# Patient Record
Sex: FEMALE | Race: WHITE | HISPANIC OR LATINO | Employment: UNEMPLOYED | URBAN - METROPOLITAN AREA
[De-identification: names, ages, dates, MRNs, and addresses within clinical notes are randomized per-mention and may not be internally consistent; named-entity substitution may affect disease eponyms.]

---

## 2022-11-21 PROBLEM — S41.101A: Status: ACTIVE | Noted: 2022-11-21
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
## 2022-11-21 PROBLEM — T79.A11A: Status: ACTIVE | Noted: 2022-11-21

## 2022-11-21 PROBLEM — S45.111A: Status: ACTIVE | Noted: 2022-11-21

## 2022-11-23 PROBLEM — S48.921A: Status: ACTIVE | Noted: 2022-11-23

## 2022-12-11 ENCOUNTER — NURSE TRIAGE (OUTPATIENT)
Dept: ADMINISTRATIVE | Facility: CLINIC | Age: 22
End: 2022-12-11

## 2022-12-11 PROBLEM — R10.9 ABDOMINAL PAIN: Status: ACTIVE | Noted: 2022-12-11

## 2022-12-11 NOTE — TELEPHONE ENCOUNTER
Reason for Disposition   [1] Bleeding from incision AND [2] won't stop after 10 minutes of direct pressure    Additional Information   Negative: [1] Major abdominal surgical incision AND [2] wound gaping open AND [3] visible internal organs   Negative: Sounds like a life-threatening emergency to the triager    Protocols used: Post-Op Incision Symptoms-A-AH  sibling called re pt out of surg a week. leg has pad on it that is full of blood and even leaking out of pad. Caller states area smells bad. cant get fever down. Pt c/o leg hurting. Rec ED now. Caller agrees.

## 2022-12-12 PROBLEM — R65.21 SEPTIC SHOCK: Status: ACTIVE | Noted: 2022-12-12

## 2022-12-12 PROBLEM — A41.9 SEPTIC SHOCK: Status: ACTIVE | Noted: 2022-12-12

## 2022-12-12 PROBLEM — T14.8XXD DELAYED WOUND HEALING: Status: ACTIVE | Noted: 2022-12-12

## 2022-12-12 PROBLEM — E44.1 MALNUTRITION OF MILD DEGREE: Status: ACTIVE | Noted: 2022-12-12

## 2022-12-17 PROBLEM — R40.0 SOMNOLENCE: Status: ACTIVE | Noted: 2022-12-17

## 2022-12-17 PROBLEM — R07.9 CHEST PAIN: Status: ACTIVE | Noted: 2022-12-17

## 2022-12-30 PROBLEM — E87.20 LACTIC ACIDOSIS: Status: ACTIVE | Noted: 2022-12-30

## 2022-12-30 PROBLEM — S45.101D: Status: ACTIVE | Noted: 2022-12-30

## 2022-12-30 PROBLEM — R10.9 ABDOMINAL PAIN: Status: RESOLVED | Noted: 2022-12-11 | Resolved: 2022-12-30

## 2022-12-30 PROBLEM — R73.9 HYPERGLYCEMIA: Status: RESOLVED | Noted: 2022-12-30 | Resolved: 2022-12-30

## 2022-12-30 PROBLEM — R73.9 HYPERGLYCEMIA: Status: ACTIVE | Noted: 2022-12-30

## 2022-12-30 PROBLEM — D64.9 NORMOCYTIC ANEMIA: Status: ACTIVE | Noted: 2022-12-30

## 2022-12-30 PROBLEM — E87.20 LACTIC ACIDOSIS: Status: RESOLVED | Noted: 2022-12-30 | Resolved: 2022-12-30

## 2022-12-30 PROBLEM — R79.89 DECREASED THYROID STIMULATING HORMONE (TSH) LEVEL: Status: ACTIVE | Noted: 2022-12-30

## 2022-12-30 PROBLEM — E87.6 HYPOKALEMIA: Status: RESOLVED | Noted: 2022-12-30 | Resolved: 2022-12-30

## 2022-12-30 PROBLEM — E83.39 HYPERPHOSPHATEMIA: Status: ACTIVE | Noted: 2022-12-30

## 2022-12-30 PROBLEM — E83.39 HYPERPHOSPHATEMIA: Status: RESOLVED | Noted: 2022-12-30 | Resolved: 2022-12-30

## 2022-12-30 PROBLEM — E88.09 HYPOALBUMINEMIA: Status: ACTIVE | Noted: 2022-12-30

## 2022-12-30 PROBLEM — R65.21 SEPTIC SHOCK: Status: RESOLVED | Noted: 2022-12-12 | Resolved: 2022-12-30

## 2022-12-30 PROBLEM — R74.01 TRANSAMINITIS: Status: RESOLVED | Noted: 2022-12-30 | Resolved: 2022-12-30

## 2022-12-30 PROBLEM — A41.9 SEPTIC SHOCK: Status: RESOLVED | Noted: 2022-12-12 | Resolved: 2022-12-30

## 2022-12-30 PROBLEM — R74.01 TRANSAMINITIS: Status: ACTIVE | Noted: 2022-12-30

## 2022-12-30 PROBLEM — E87.6 HYPOKALEMIA: Status: ACTIVE | Noted: 2022-12-30

## 2023-01-03 PROBLEM — E83.39 HYPERPHOSPHATEMIA: Status: RESOLVED | Noted: 2022-12-30 | Resolved: 2023-01-03

## 2023-01-09 ENCOUNTER — PATIENT OUTREACH (OUTPATIENT)
Dept: ADMINISTRATIVE | Facility: CLINIC | Age: 23
End: 2023-01-09

## 2023-01-11 PROBLEM — R10.84 GENERALIZED ABDOMINAL PAIN: Status: ACTIVE | Noted: 2022-12-11

## 2023-01-14 PROBLEM — A49.8 CLOSTRIDIUM DIFFICILE INFECTION: Status: ACTIVE | Noted: 2022-12-11

## 2023-01-15 PROBLEM — K52.9 COLITIS: Status: ACTIVE | Noted: 2023-01-15

## 2023-01-15 PROBLEM — R19.7 DIARRHEA: Status: ACTIVE | Noted: 2023-01-15

## 2023-01-15 PROBLEM — A04.72 COLITIS DUE TO CLOSTRIDIUM DIFFICILE: Status: ACTIVE | Noted: 2023-01-15

## 2023-01-15 PROBLEM — R10.84 GENERALIZED ABDOMINAL PAIN: Status: ACTIVE | Noted: 2023-01-15

## 2023-01-21 PROBLEM — D72.829 LEUKOCYTOSIS: Status: RESOLVED | Noted: 2023-01-21 | Resolved: 2023-01-21

## 2023-01-21 PROBLEM — E83.39 HYPOPHOSPHATEMIA: Status: ACTIVE | Noted: 2023-01-21

## 2023-01-21 PROBLEM — E87.1 HYPONATREMIA: Status: RESOLVED | Noted: 2023-01-21 | Resolved: 2023-01-21

## 2023-01-21 PROBLEM — E87.6 HYPOKALEMIA: Status: RESOLVED | Noted: 2022-12-30 | Resolved: 2023-01-21

## 2023-01-21 PROBLEM — E87.1 HYPONATREMIA: Status: ACTIVE | Noted: 2023-01-21

## 2023-01-21 PROBLEM — R19.7 DIARRHEA: Status: RESOLVED | Noted: 2023-01-15 | Resolved: 2023-01-21

## 2023-01-21 PROBLEM — D72.829 LEUKOCYTOSIS: Status: ACTIVE | Noted: 2023-01-21

## 2023-01-21 PROBLEM — R10.84 GENERALIZED ABDOMINAL PAIN: Status: RESOLVED | Noted: 2023-01-15 | Resolved: 2023-01-21

## 2023-01-21 PROBLEM — E83.39 HYPOPHOSPHATEMIA: Status: RESOLVED | Noted: 2023-01-21 | Resolved: 2023-01-21

## 2023-01-21 PROBLEM — E83.39 HYPERPHOSPHATEMIA: Status: RESOLVED | Noted: 2022-12-30 | Resolved: 2023-01-21

## 2023-01-21 PROBLEM — R73.9 HYPERGLYCEMIA: Status: RESOLVED | Noted: 2022-12-30 | Resolved: 2023-01-21

## 2023-01-24 ENCOUNTER — PATIENT OUTREACH (OUTPATIENT)
Dept: ADMINISTRATIVE | Facility: CLINIC | Age: 23
End: 2023-01-24

## 2023-01-24 ENCOUNTER — PATIENT MESSAGE (OUTPATIENT)
Dept: ADMINISTRATIVE | Facility: CLINIC | Age: 23
End: 2023-01-24

## 2023-02-07 PROBLEM — K08.89 PAIN, DENTAL: Status: ACTIVE | Noted: 2023-02-07

## 2025-04-18 NOTE — PROGRESS NOTES
C3 nurse attempted to contact Fátima Crespo for a TCC post hospital discharge follow up call. No answer. The patient does not have a scheduled HOSFU appointment, and the pt does not have an [facility] PCP.          [Subsequent Evaluation] : a subsequent evaluation for [Mother] : mother